# Patient Record
Sex: FEMALE | Race: BLACK OR AFRICAN AMERICAN
[De-identification: names, ages, dates, MRNs, and addresses within clinical notes are randomized per-mention and may not be internally consistent; named-entity substitution may affect disease eponyms.]

---

## 2017-10-29 ENCOUNTER — HOSPITAL ENCOUNTER (EMERGENCY)
Dept: HOSPITAL 62 - ER | Age: 52
Discharge: HOME | End: 2017-10-29
Payer: COMMERCIAL

## 2017-10-29 VITALS — SYSTOLIC BLOOD PRESSURE: 139 MMHG | DIASTOLIC BLOOD PRESSURE: 79 MMHG

## 2017-10-29 DIAGNOSIS — R53.83: ICD-10-CM

## 2017-10-29 DIAGNOSIS — R00.0: ICD-10-CM

## 2017-10-29 DIAGNOSIS — Z88.8: ICD-10-CM

## 2017-10-29 DIAGNOSIS — F43.23: Primary | ICD-10-CM

## 2017-10-29 DIAGNOSIS — R51: ICD-10-CM

## 2017-10-29 PROCEDURE — 99283 EMERGENCY DEPT VISIT LOW MDM: CPT

## 2017-10-29 PROCEDURE — 93005 ELECTROCARDIOGRAM TRACING: CPT

## 2017-10-29 PROCEDURE — 93010 ELECTROCARDIOGRAM REPORT: CPT

## 2017-10-29 NOTE — EKG REPORT
SEVERITY:- ABNORMAL ECG -

SINUS RHYTHM

LEFT VENTRICULAR HYPERTROPHY

:

Confirmed by: Magnus Christian 29-Oct-2017 22:48:56

## 2017-10-29 NOTE — ER DOCUMENT REPORT
ED General





- General


Chief Complaint: Anxiety


Stated Complaint: HEADACHE


Time Seen by Provider: 10/29/17 20:53


Notes: 





Patient is a 52-year-old female who presents with headache, emotionally 

overwhelmed, exhausted, after her mother recently passed away.  Patient reports 

that she feels tired, and does have a bitemporal, throbbing, aching headache.  

Patient attributes the symptoms to emotional distress over the past several 

weeks.  Nothing seems to improve or worsen her symptoms.  She denies a history 

of prior panic attacks or anxiety disorders.  She denies any chest pain or 

shortness of breath.  She has not seen her primary doctor regarding today's 

concerns.


TRAVEL OUTSIDE OF THE [place] IN LAST 30 DAYS: No





- Related Data


Allergies/Adverse Reactions: 


 





ketorolac tromethamine [From Toradol] Allergy (Verified 10/29/17 20:50)


 











Past Medical History





- General


Information source: Patient





- Social History


Smoking Status: Never Smoker


Frequency of alcohol use: None


Drug Abuse: None


Lives with: Family


Family History: Reviewed & Not Pertinent


Psychiatric Medical History: 


   Denies: Hx Depression


Surgical Hx: Negative





- Immunizations


Hx Diphtheria, Pertussis, Tetanus Vaccination: Yes





Review of Systems





- Review of Systems


Notes: 





Constitutional: Negative for fever.


HENT: Negative for sore throat.


Eyes: Negative for visual changes.


Cardiovascular: Negative for chest pain.


Respiratory: Negative for shortness of breath.


Gastrointestinal: Negative for abdominal pain, vomiting or diarrhea.


Genitourinary: Negative for dysuria.


Musculoskeletal: Negative for back pain.


Skin: Negative for rash.


Neurological: Positive for headaches.





10 point ROS negative except as marked above and in HPI.





Physical Exam





- Vital signs


Vitals: 


 











Temp Pulse Resp BP Pulse Ox


 


 98.8 F   114 H  20   157/89 H  99 


 


 10/29/17 20:39  10/29/17 20:39  10/29/17 20:39  10/29/17 20:39  10/29/17 20:39











Interpretation: Hypertensive, Tachycardic


Notes: 





PHYSICAL EXAMINATION:





GENERAL: Well-appearing, well-nourished and in no acute distress.





HEAD: Atraumatic, normocephalic.





EYES: Pupils equal round and reactive to light, extraocular movements intact, 

sclera anicteric, conjunctiva are normal.





ENT: nares patent, oropharynx clear without exudates.  Moist mucous membranes.





NECK: Normal range of motion, supple without lymphadenopathy





LUNGS: Breath sounds clear to auscultation bilaterally and equal.  No wheezes 

rales or rhonchi.





HEART: Regular rate and rhythm without murmurs





ABDOMEN: Soft, nontender, normoactive bowel sounds.  No guarding, no rebound.  

No masses appreciated.





EXTREMITIES: Normal range of motion, no pitting or edema.  No cyanosis.





NEUROLOGICAL: No focal neurological deficits. Moves all extremities 

spontaneously and on command.





PSYCH: Somewhat anxious, depressed mood from baseline





SKIN: Warm, Dry, normal turgor, no rashes or lesions noted.





Course





- Re-evaluation


Re-evalutation: 





10/29/17 22:07


Patient presents with history most consistent with an anxiety reaction in the 

setting of grief.   Symptoms did resolve after receiving medical therapy here 

in the emergency department.  Vitals otherwise within normal limits.  I do not 

suspect an acute pulmonary embolus, ACS, pneumothorax, or any other acute left 

threatening pathology based on history and exam.  I do not believe any labs or 

imaging are indicated at this time.  At this time will discharge with return 

precautions and follow-up recommendations.  Verbal discharge instructions given 

a the bedside and opportunity for questions given. Medication warnings 

reviewed. Patient is in agreement with this plan and has verbalized 

understanding of return precautions and the need for primary care follow-up in 

the next 24-72 hours.





- Vital Signs


Vital signs: 


 











Temp Pulse Resp BP Pulse Ox


 


 98.1 F   85   16   139/79 H  99 


 


 10/29/17 21:48  10/29/17 21:48  10/29/17 21:48  10/29/17 21:48  10/29/17 21:48














- EKG Interpretation by Me


Additional EKG results interpreted by me: 





10/30/17 01:47


Sinus rhythm.  Rate 77.  No ST elevations or depressions.  QTC is 408.





Discharge





- Discharge


Clinical Impression: 


 Grief reaction





Condition: Good


Disposition: HOME, SELF-CARE


Instructions:  Anxiety ([facility])


Additional Instructions: 


Please return if you have thoughts of wanting to hurt yourself, worsening of 

your symptoms, or any symptoms that are worrisome to you.  We are always here 

for you Renetta!


Prescriptions: 


Diazepam [Valium 5 mg Tablet] 5 - 10 mg PO DAILY PRN #10 tablet


 PRN Reason: 


Referrals: 


CARLOTTA LOZADA MD [Primary Care Provider] - Follow up as needed

## 2017-10-30 NOTE — EKG REPORT
SEVERITY:- ABNORMAL ECG -

SINUS RHYTHM

LEFT VENTRICULAR HYPERTROPHY

:

Confirmed on behalf of: Magnus Christian 30-Oct-2017 11:02:52

## 2017-12-28 ENCOUNTER — HOSPITAL ENCOUNTER (EMERGENCY)
Dept: HOSPITAL 62 - ER | Age: 52
Discharge: HOME | End: 2017-12-28
Payer: COMMERCIAL

## 2017-12-28 VITALS — SYSTOLIC BLOOD PRESSURE: 138 MMHG | DIASTOLIC BLOOD PRESSURE: 79 MMHG

## 2017-12-28 DIAGNOSIS — R05: ICD-10-CM

## 2017-12-28 DIAGNOSIS — Z86.73: ICD-10-CM

## 2017-12-28 DIAGNOSIS — J40: Primary | ICD-10-CM

## 2017-12-28 DIAGNOSIS — R07.89: ICD-10-CM

## 2017-12-28 PROCEDURE — 99283 EMERGENCY DEPT VISIT LOW MDM: CPT

## 2017-12-28 PROCEDURE — 71020: CPT

## 2017-12-28 NOTE — RADIOLOGY REPORT (SQ)
EXAM DESCRIPTION: CHEST PA/LAT



CLINICAL HISTORY: er17 productive/painful cough



COMPARISON: 8/18/2014



FINDINGS: Frontal and lateral views of the chest.  The

cardiomediastinal silhouette has normal size and contour. No

consolidation, pneumothorax, or pleural effusion.  No displaced

rib fractures identified.  Upper abdominal soft tissues are

unremarkable.



IMPRESSION:



1. No acute pulmonary process identified.

## 2017-12-28 NOTE — ER DOCUMENT REPORT
ED General





- General


Chief Complaint: Painful Cough


Stated Complaint: PAINFUL COUGH


Time Seen by Provider: 12/28/17 02:37


Notes: 





Patient is a 52-year-old female with a history of a prior CVA who presents with 

1 month of persistent cough.  The patient reports that she was treated with an 

azithromycin course approximately 3 weeks ago but did not have any improvement 

of her symptoms.  She became concerned about the ongoing nature of her cough 

and decided to come to the emergency department tonight.  She notes that 

nothing seems to improve or worsen her symptoms but she does note that her 

symptoms are worse at night often secondary to postnasal drip.  Multiple sick 

contacts with same symptoms.  She has not had any fever, vomiting, diarrhea, 

dyspnea, or exertional dyspnea.  She was unable see her primary care doctor due 

to the holiday hours.  She has had similar symptoms in the past with upper 

respiratory infections.


TRAVEL OUTSIDE OF THE [place] IN LAST 30 DAYS: No





- Related Data


Allergies/Adverse Reactions: 


 





ketorolac tromethamine [From Toradol] Allergy (Verified 10/29/17 20:50)


 











Past Medical History





- General


Information source: Patient





- Social History


Smoking Status: Never Smoker


Chew tobacco use (# tins/day): No


Frequency of alcohol use: Occasional


Drug Abuse: None


Lives with: Family


Family History: Reviewed & Not Pertinent


Patient has suicidal ideation: No


Patient has homicidal ideation: No





- Past Medical History


Cardiac Medical History: Reports: Hx Hypercholesterolemia, Hx Hypertension


Renal/ Medical History: Denies: Hx Peritoneal Dialysis


Psychiatric Medical History: 


   Denies: Hx Depression





- Immunizations


Hx Diphtheria, Pertussis, Tetanus Vaccination: Yes





Review of Systems





- Review of Systems


Notes: 





Constitutional: Negative for fever.


HENT: Negative for sore throat.


Eyes: Negative for visual changes.


Cardiovascular: Negative for chest pain.


Respiratory: Positive for cough


Gastrointestinal: Negative for abdominal pain, vomiting or diarrhea.


Genitourinary: Negative for dysuria.


Musculoskeletal: Negative for back pain.


Skin: Negative for rash.


Neurological: Negative for headaches, weakness or numbness.





10 point ROS negative except as marked above and in HPI.





Physical Exam





- Vital signs


Vitals: 





 











Temp Pulse Resp BP Pulse Ox


 


 98.7 F   86   18   138/79 H  99 


 


 12/28/17 02:27  12/28/17 02:27  12/28/17 02:27  12/28/17 02:27  12/28/17 02:27











Interpretation: Normal


Notes: 





PHYSICAL EXAMINATION:





GENERAL: Well-appearing, well-nourished and in no acute distress.





HEAD: Atraumatic, normocephalic.





EYES: Pupils equal round and reactive to light, extraocular movements intact, 

sclera anicteric, conjunctiva are normal.





ENT: nares patent, oropharynx clear without exudates.  Moist mucous membranes.





NECK: Normal range of motion, supple without lymphadenopathy





LUNGS: Breath sounds clear to auscultation bilaterally and equal.  No wheezes 

rales or rhonchi.





HEART: Regular rate and rhythm without murmurs





ABDOMEN: Soft, nontender, normoactive bowel sounds.  No guarding, no rebound.  

No masses appreciated.





EXTREMITIES: Normal range of motion, no pitting or edema.  No cyanosis.





NEUROLOGICAL: No focal neurological deficits. Moves all extremities 

spontaneously and on command.





PSYCH: Normal mood, normal affect.





SKIN: Warm, Dry, normal turgor, no rashes or lesions noted.





Course





- Re-evaluation


Re-evalutation: 





12/28/17 02:56


Patient presents with a clinical history and exam most consistent with an acute 

viral bronchitis.  Patient is overall well in appearance without tachypnea, 

hypoxemia, tachycardia, or difficulty with ambulation.  Breath sounds are clear 

bilaterally.  No fever.  Patient does have additional signs of upper 

respiratory infection including nasal congestion, sore throat, and sinus 

pressure.  Chest x-ray is clear without any evidence of a pneumonia.  At this 

time will discharge with return precautions and follow-up recommendations.  

Verbal discharge instructions given a the bedside and opportunity for questions 

given. Medication warnings reviewed. Patient is in agreement with this plan and 

has verbalized understanding of return precautions and the need for primary 

care follow-up in the next 24-72 hours.





- Vital Signs


Vital signs: 





 











Temp Pulse Resp BP Pulse Ox


 


 98.7 F   86   18   138/79 H  99 


 


 12/28/17 02:27  12/28/17 02:27  12/28/17 02:27  12/28/17 02:27  12/28/17 02:27














- Diagnostic Test


Radiology reviewed: Image reviewed, Reports reviewed


Radiology results interpreted by me: 





12/28/17 02:56


Chest x-ray: No acute infiltrate or pneumothorax





Discharge





- Discharge


Clinical Impression: 


 Bronchitis, Chest wall pain





Condition: Good


Disposition: HOME, SELF-CARE


Additional Instructions: 


You were seen for symptoms most consistent with bronchitis. This can take up to 

12 weeks to fully resolve. This is generally due to a viral infection. Please 

follow-up with your primary doctor in the next 2-3 days. Return if you develop 

worsening cough, vomiting, fever >100.4, pass out, begin coughing blood, or 

have any other symptoms that are concerning to you. Please use the medications 

prescribed today as directed.


Prescriptions: 


Benzonatate [Tessalon Perles 100 mg Capsule] 100 mg PO Q8HP PRN #40 capsule


 PRN Reason: 


Referrals: 


CARLOTTA LOZADA MD [Primary Care Provider] - Follow up as needed

## 2018-02-25 ENCOUNTER — HOSPITAL ENCOUNTER (OUTPATIENT)
Dept: HOSPITAL 62 - LAB | Age: 53
End: 2018-02-25
Attending: INTERNAL MEDICINE
Payer: COMMERCIAL

## 2018-02-25 DIAGNOSIS — E55.9: Primary | ICD-10-CM

## 2018-02-25 LAB
ADD MANUAL DIFF: NO
ALBUMIN SERPL-MCNC: 4 G/DL (ref 3.5–5)
ALP SERPL-CCNC: 84 U/L (ref 38–126)
ALT SERPL-CCNC: 24 U/L (ref 9–52)
ANION GAP SERPL CALC-SCNC: 12 MMOL/L (ref 5–19)
AST SERPL-CCNC: 30 U/L (ref 14–36)
BASOPHILS # BLD AUTO: 0 10^3/UL (ref 0–0.2)
BASOPHILS NFR BLD AUTO: 0.4 % (ref 0–2)
BILIRUB SERPL-MCNC: < 0.1 MG/DL (ref 0.2–1.3)
BUN SERPL-MCNC: 8 MG/DL (ref 7–20)
CALCIUM: 9.2 MG/DL (ref 8.4–10.2)
CHLORIDE SERPL-SCNC: 108 MMOL/L (ref 98–107)
CHOLEST SERPL-MCNC: 156.87 MG/DL (ref 0–200)
CO2 SERPL-SCNC: 24 MMOL/L (ref 22–30)
EOSINOPHIL # BLD AUTO: 0.2 10^3/UL (ref 0–0.6)
EOSINOPHIL NFR BLD AUTO: 3 % (ref 0–6)
ERYTHROCYTE [DISTWIDTH] IN BLOOD BY AUTOMATED COUNT: 14.7 % (ref 11.5–14)
GLUCOSE SERPL-MCNC: 101 MG/DL (ref 75–110)
HCT VFR BLD CALC: 36 % (ref 36–47)
HGB BLD-MCNC: 12 G/DL (ref 12–15.5)
LDLC SERPL DIRECT ASSAY-MCNC: 89 MG/DL (ref ?–100)
LYMPHOCYTES # BLD AUTO: 3.3 10^3/UL (ref 0.5–4.7)
LYMPHOCYTES NFR BLD AUTO: 60.8 % (ref 13–45)
MCH RBC QN AUTO: 26.9 PG (ref 27–33.4)
MCHC RBC AUTO-ENTMCNC: 33.2 G/DL (ref 32–36)
MCV RBC AUTO: 81 FL (ref 80–97)
MONOCYTES # BLD AUTO: 0.5 10^3/UL (ref 0.1–1.4)
MONOCYTES NFR BLD AUTO: 9.1 % (ref 3–13)
NEUTROPHILS # BLD AUTO: 1.5 10^3/UL (ref 1.7–8.2)
NEUTS SEG NFR BLD AUTO: 26.7 % (ref 42–78)
PLATELET # BLD: 242 10^3/UL (ref 150–450)
POTASSIUM SERPL-SCNC: 4 MMOL/L (ref 3.6–5)
PROT SERPL-MCNC: 6.9 G/DL (ref 6.3–8.2)
RBC # BLD AUTO: 4.44 10^6/UL (ref 3.72–5.28)
SODIUM SERPL-SCNC: 143.9 MMOL/L (ref 137–145)
TOTAL CELLS COUNTED % (AUTO): 100 %
TRIGL SERPL-MCNC: 106 MG/DL (ref ?–150)
VLDLC SERPL CALC-MCNC: 21 MG/DL (ref 10–31)
WBC # BLD AUTO: 5.5 10^3/UL (ref 4–10.5)

## 2018-02-25 PROCEDURE — 82306 VITAMIN D 25 HYDROXY: CPT

## 2018-02-25 PROCEDURE — 36415 COLL VENOUS BLD VENIPUNCTURE: CPT

## 2018-02-25 PROCEDURE — 80061 LIPID PANEL: CPT

## 2018-02-25 PROCEDURE — 85025 COMPLETE CBC W/AUTO DIFF WBC: CPT

## 2018-02-25 PROCEDURE — 80053 COMPREHEN METABOLIC PANEL: CPT

## 2019-03-10 ENCOUNTER — HOSPITAL ENCOUNTER (OUTPATIENT)
Dept: HOSPITAL 62 - LAB | Age: 54
End: 2019-03-10
Attending: INTERNAL MEDICINE
Payer: COMMERCIAL

## 2019-03-10 DIAGNOSIS — Z00.00: Primary | ICD-10-CM

## 2019-03-10 LAB
ABSOLUTE LYMPHOCYTES# (MANUAL): 4.6 10^3/UL (ref 0.5–4.7)
ABSOLUTE MONOCYTES # (MANUAL): 0.2 10^3/UL (ref 0.1–1.4)
ABSOLUTE NEUTROPHILS# (MANUAL): 1 10^3/UL (ref 1.7–8.2)
ADD MANUAL DIFF: YES
ALBUMIN SERPL-MCNC: 4.5 G/DL (ref 3.5–5)
ALP SERPL-CCNC: 84 U/L (ref 38–126)
ALT SERPL-CCNC: 17 U/L (ref 9–52)
ANION GAP SERPL CALC-SCNC: 10 MMOL/L (ref 5–19)
ANISOCYTOSIS BLD QL SMEAR: (no result)
AST SERPL-CCNC: 26 U/L (ref 14–36)
BASOPHILS NFR BLD MANUAL: 0 % (ref 0–2)
BILIRUB DIRECT SERPL-MCNC: 0.1 MG/DL (ref 0–0.4)
BILIRUB SERPL-MCNC: 0.5 MG/DL (ref 0.2–1.3)
BUN SERPL-MCNC: 11 MG/DL (ref 7–20)
CALCIUM: 10 MG/DL (ref 8.4–10.2)
CHLORIDE SERPL-SCNC: 106 MMOL/L (ref 98–107)
CO2 SERPL-SCNC: 24 MMOL/L (ref 22–30)
EOSINOPHIL NFR BLD MANUAL: 0 % (ref 0–6)
ERYTHROCYTE [DISTWIDTH] IN BLOOD BY AUTOMATED COUNT: 14.3 % (ref 11.5–14)
GLUCOSE SERPL-MCNC: 101 MG/DL (ref 75–110)
HCT VFR BLD CALC: 39.8 % (ref 36–47)
HGB BLD-MCNC: 13.5 G/DL (ref 12–15.5)
LDLC SERPL DIRECT ASSAY-MCNC: 111 MG/DL (ref ?–100)
MCH RBC QN AUTO: 28 PG (ref 27–33.4)
MCHC RBC AUTO-ENTMCNC: 33.9 G/DL (ref 32–36)
MCV RBC AUTO: 82 FL (ref 80–97)
MONOCYTES % (MANUAL): 4 % (ref 3–13)
PLATELET # BLD: 248 10^3/UL (ref 150–450)
PLATELET COMMENT: ADEQUATE
POLYCHROMASIA BLD QL SMEAR: (no result)
POTASSIUM SERPL-SCNC: 4.1 MMOL/L (ref 3.6–5)
PROT SERPL-MCNC: 7.9 G/DL (ref 6.3–8.2)
RBC # BLD AUTO: 4.84 10^6/UL (ref 3.72–5.28)
SEGMENTED NEUTROPHILS % (MAN): 17 % (ref 42–78)
SODIUM SERPL-SCNC: 139.6 MMOL/L (ref 137–145)
TOTAL CELLS COUNTED BLD: 100
TRIGL SERPL-MCNC: 63 MG/DL (ref ?–150)
VLDLC SERPL CALC-MCNC: 13 MG/DL (ref 10–31)
WBC # BLD AUTO: 5.8 10^3/UL (ref 4–10.5)

## 2019-03-10 PROCEDURE — 80053 COMPREHEN METABOLIC PANEL: CPT

## 2019-03-10 PROCEDURE — 85025 COMPLETE CBC W/AUTO DIFF WBC: CPT

## 2019-03-10 PROCEDURE — 36415 COLL VENOUS BLD VENIPUNCTURE: CPT

## 2019-03-10 PROCEDURE — 80061 LIPID PANEL: CPT

## 2019-03-11 LAB — VARIANT LYMPHS NFR BLD MANUAL: 79 % (ref 13–45)

## 2019-03-12 LAB — PATH REV BLD -IMP: (no result)

## 2019-06-05 ENCOUNTER — HOSPITAL ENCOUNTER (OUTPATIENT)
Dept: HOSPITAL 62 - LB | Age: 54
End: 2019-06-05
Attending: INTERNAL MEDICINE
Payer: COMMERCIAL

## 2019-06-05 DIAGNOSIS — E55.9: Primary | ICD-10-CM

## 2019-06-05 DIAGNOSIS — Z11.4: ICD-10-CM

## 2019-07-19 ENCOUNTER — HOSPITAL ENCOUNTER (OUTPATIENT)
Dept: HOSPITAL 62 - LAB | Age: 54
End: 2019-07-19
Attending: INTERNAL MEDICINE
Payer: COMMERCIAL

## 2019-07-19 DIAGNOSIS — R53.83: Primary | ICD-10-CM

## 2019-12-19 ENCOUNTER — HOSPITAL ENCOUNTER (OUTPATIENT)
Dept: HOSPITAL 62 - LB | Age: 54
End: 2019-12-19
Attending: INTERNAL MEDICINE
Payer: COMMERCIAL

## 2019-12-19 DIAGNOSIS — E55.9: Primary | ICD-10-CM

## 2019-12-19 PROCEDURE — 36415 COLL VENOUS BLD VENIPUNCTURE: CPT

## 2019-12-19 PROCEDURE — 82306 VITAMIN D 25 HYDROXY: CPT

## 2020-03-18 ENCOUNTER — HOSPITAL ENCOUNTER (OUTPATIENT)
Dept: HOSPITAL 62 - OD | Age: 55
End: 2020-03-18
Attending: INTERNAL MEDICINE
Payer: COMMERCIAL

## 2020-03-18 DIAGNOSIS — Z00.00: Primary | ICD-10-CM

## 2020-03-18 DIAGNOSIS — R53.83: ICD-10-CM

## 2020-03-18 DIAGNOSIS — E55.9: ICD-10-CM

## 2020-03-18 LAB
ADD MANUAL DIFF: NO
ALBUMIN SERPL-MCNC: 4.1 G/DL (ref 3.5–5)
ALP SERPL-CCNC: 78 U/L (ref 38–126)
ANION GAP SERPL CALC-SCNC: 8 MMOL/L (ref 5–19)
AST SERPL-CCNC: 29 U/L (ref 14–36)
BASOPHILS # BLD AUTO: 0 10^3/UL (ref 0–0.2)
BASOPHILS NFR BLD AUTO: 0.3 % (ref 0–2)
BILIRUB DIRECT SERPL-MCNC: 0 MG/DL (ref 0–0.4)
BILIRUB SERPL-MCNC: 0.4 MG/DL (ref 0.2–1.3)
BUN SERPL-MCNC: 10 MG/DL (ref 7–20)
CALCIUM: 9.4 MG/DL (ref 8.4–10.2)
CHLORIDE SERPL-SCNC: 107 MMOL/L (ref 98–107)
CHOLEST SERPL-MCNC: 212.27 MG/DL (ref 0–200)
CO2 SERPL-SCNC: 25 MMOL/L (ref 22–30)
EOSINOPHIL # BLD AUTO: 0.1 10^3/UL (ref 0–0.6)
EOSINOPHIL NFR BLD AUTO: 1.8 % (ref 0–6)
ERYTHROCYTE [DISTWIDTH] IN BLOOD BY AUTOMATED COUNT: 14.8 % (ref 11.5–14)
FREE T4 (FREE THYROXINE): 0.91 NG/DL (ref 0.78–2.19)
GLUCOSE SERPL-MCNC: 106 MG/DL (ref 75–110)
HCT VFR BLD CALC: 39.6 % (ref 36–47)
HGB BLD-MCNC: 13.3 G/DL (ref 12–15.5)
LDLC SERPL DIRECT ASSAY-MCNC: 126 MG/DL (ref ?–100)
LYMPHOCYTES # BLD AUTO: 2.7 10^3/UL (ref 0.5–4.7)
LYMPHOCYTES NFR BLD AUTO: 55.9 % (ref 13–45)
MCH RBC QN AUTO: 27.5 PG (ref 27–33.4)
MCHC RBC AUTO-ENTMCNC: 33.7 G/DL (ref 32–36)
MCV RBC AUTO: 82 FL (ref 80–97)
MONOCYTES # BLD AUTO: 0.4 10^3/UL (ref 0.1–1.4)
MONOCYTES NFR BLD AUTO: 8.4 % (ref 3–13)
NEUTROPHILS # BLD AUTO: 1.6 10^3/UL (ref 1.7–8.2)
NEUTS SEG NFR BLD AUTO: 33.6 % (ref 42–78)
PLATELET # BLD: 239 10^3/UL (ref 150–450)
POTASSIUM SERPL-SCNC: 4.2 MMOL/L (ref 3.6–5)
PROT SERPL-MCNC: 7.5 G/DL (ref 6.3–8.2)
RBC # BLD AUTO: 4.84 10^6/UL (ref 3.72–5.28)
T3FREE SERPL-MCNC: 4.75 PG/ML (ref 2.77–5.27)
TOTAL CELLS COUNTED % (AUTO): 100 %
TRIGL SERPL-MCNC: 66 MG/DL (ref ?–150)
TSH SERPL-ACNC: 1.92 UIU/ML (ref 0.47–4.68)
VLDLC SERPL CALC-MCNC: 13 MG/DL (ref 10–31)
WBC # BLD AUTO: 4.8 10^3/UL (ref 4–10.5)

## 2020-03-18 PROCEDURE — 84439 ASSAY OF FREE THYROXINE: CPT

## 2020-03-18 PROCEDURE — 36415 COLL VENOUS BLD VENIPUNCTURE: CPT

## 2020-03-18 PROCEDURE — 82306 VITAMIN D 25 HYDROXY: CPT

## 2020-03-18 PROCEDURE — 84436 ASSAY OF TOTAL THYROXINE: CPT

## 2020-03-18 PROCEDURE — 85025 COMPLETE CBC W/AUTO DIFF WBC: CPT

## 2020-03-18 PROCEDURE — 84481 FREE ASSAY (FT-3): CPT

## 2020-03-18 PROCEDURE — 80061 LIPID PANEL: CPT

## 2020-03-18 PROCEDURE — 84443 ASSAY THYROID STIM HORMONE: CPT

## 2020-03-18 PROCEDURE — 80053 COMPREHEN METABOLIC PANEL: CPT
